# Patient Record
Sex: MALE | Race: WHITE | NOT HISPANIC OR LATINO | URBAN - METROPOLITAN AREA
[De-identification: names, ages, dates, MRNs, and addresses within clinical notes are randomized per-mention and may not be internally consistent; named-entity substitution may affect disease eponyms.]

---

## 2020-02-29 ENCOUNTER — EMERGENCY (EMERGENCY)
Facility: HOSPITAL | Age: 60
LOS: 1 days | Discharge: HOME | End: 2020-02-29
Attending: STUDENT IN AN ORGANIZED HEALTH CARE EDUCATION/TRAINING PROGRAM
Payer: COMMERCIAL

## 2020-02-29 VITALS
HEART RATE: 72 BPM | OXYGEN SATURATION: 96 % | TEMPERATURE: 98 F | HEIGHT: 68 IN | WEIGHT: 179.9 LBS | SYSTOLIC BLOOD PRESSURE: 150 MMHG | DIASTOLIC BLOOD PRESSURE: 87 MMHG | RESPIRATION RATE: 16 BRPM

## 2020-02-29 VITALS
RESPIRATION RATE: 16 BRPM | OXYGEN SATURATION: 97 % | TEMPERATURE: 98 F | HEART RATE: 76 BPM | SYSTOLIC BLOOD PRESSURE: 117 MMHG | DIASTOLIC BLOOD PRESSURE: 76 MMHG

## 2020-02-29 LAB
ALBUMIN SERPL ELPH-MCNC: 4.3 G/DL — SIGNIFICANT CHANGE UP (ref 3.3–5.2)
ALP SERPL-CCNC: 49 U/L — SIGNIFICANT CHANGE UP (ref 40–120)
ALT FLD-CCNC: 21 U/L — SIGNIFICANT CHANGE UP
ANION GAP SERPL CALC-SCNC: 15 MMOL/L — SIGNIFICANT CHANGE UP (ref 5–17)
AST SERPL-CCNC: 23 U/L — SIGNIFICANT CHANGE UP
BILIRUB SERPL-MCNC: <0.2 MG/DL — LOW (ref 0.4–2)
BUN SERPL-MCNC: 29 MG/DL — HIGH (ref 8–20)
CALCIUM SERPL-MCNC: 9.3 MG/DL — SIGNIFICANT CHANGE UP (ref 8.6–10.2)
CHLORIDE SERPL-SCNC: 101 MMOL/L — SIGNIFICANT CHANGE UP (ref 98–107)
CO2 SERPL-SCNC: 25 MMOL/L — SIGNIFICANT CHANGE UP (ref 22–29)
CREAT SERPL-MCNC: 1.05 MG/DL — SIGNIFICANT CHANGE UP (ref 0.5–1.3)
GLUCOSE SERPL-MCNC: 133 MG/DL — HIGH (ref 70–99)
HCT VFR BLD CALC: 37.6 % — LOW (ref 39–50)
HGB BLD-MCNC: 13 G/DL — SIGNIFICANT CHANGE UP (ref 13–17)
LIDOCAIN IGE QN: 22 U/L — SIGNIFICANT CHANGE UP (ref 22–51)
MAGNESIUM SERPL-MCNC: 2 MG/DL — SIGNIFICANT CHANGE UP (ref 1.6–2.6)
MCHC RBC-ENTMCNC: 32.6 PG — SIGNIFICANT CHANGE UP (ref 27–34)
MCHC RBC-ENTMCNC: 34.6 GM/DL — SIGNIFICANT CHANGE UP (ref 32–36)
MCV RBC AUTO: 94.2 FL — SIGNIFICANT CHANGE UP (ref 80–100)
NT-PROBNP SERPL-SCNC: 16 PG/ML — SIGNIFICANT CHANGE UP (ref 0–300)
PLATELET # BLD AUTO: 204 K/UL — SIGNIFICANT CHANGE UP (ref 150–400)
POTASSIUM SERPL-MCNC: 3.4 MMOL/L — LOW (ref 3.5–5.3)
POTASSIUM SERPL-SCNC: 3.4 MMOL/L — LOW (ref 3.5–5.3)
PROT SERPL-MCNC: 7.1 G/DL — SIGNIFICANT CHANGE UP (ref 6.6–8.7)
RBC # BLD: 3.99 M/UL — LOW (ref 4.2–5.8)
RBC # FLD: 11.9 % — SIGNIFICANT CHANGE UP (ref 10.3–14.5)
SODIUM SERPL-SCNC: 141 MMOL/L — SIGNIFICANT CHANGE UP (ref 135–145)
TROPONIN T SERPL-MCNC: <0.01 NG/ML — SIGNIFICANT CHANGE UP (ref 0–0.06)
WBC # BLD: 5.43 K/UL — SIGNIFICANT CHANGE UP (ref 3.8–10.5)
WBC # FLD AUTO: 5.43 K/UL — SIGNIFICANT CHANGE UP (ref 3.8–10.5)

## 2020-02-29 PROCEDURE — 70450 CT HEAD/BRAIN W/O DYE: CPT

## 2020-02-29 PROCEDURE — 71046 X-RAY EXAM CHEST 2 VIEWS: CPT | Mod: 26

## 2020-02-29 PROCEDURE — 85027 COMPLETE CBC AUTOMATED: CPT

## 2020-02-29 PROCEDURE — 80053 COMPREHEN METABOLIC PANEL: CPT

## 2020-02-29 PROCEDURE — 71046 X-RAY EXAM CHEST 2 VIEWS: CPT

## 2020-02-29 PROCEDURE — 80307 DRUG TEST PRSMV CHEM ANLYZR: CPT

## 2020-02-29 PROCEDURE — 84484 ASSAY OF TROPONIN QUANT: CPT

## 2020-02-29 PROCEDURE — 70450 CT HEAD/BRAIN W/O DYE: CPT | Mod: 26

## 2020-02-29 PROCEDURE — 93005 ELECTROCARDIOGRAM TRACING: CPT

## 2020-02-29 PROCEDURE — 99284 EMERGENCY DEPT VISIT MOD MDM: CPT | Mod: 25

## 2020-02-29 PROCEDURE — 83735 ASSAY OF MAGNESIUM: CPT

## 2020-02-29 PROCEDURE — 83880 ASSAY OF NATRIURETIC PEPTIDE: CPT

## 2020-02-29 PROCEDURE — 36415 COLL VENOUS BLD VENIPUNCTURE: CPT

## 2020-02-29 PROCEDURE — 93010 ELECTROCARDIOGRAM REPORT: CPT

## 2020-02-29 PROCEDURE — 99285 EMERGENCY DEPT VISIT HI MDM: CPT

## 2020-02-29 PROCEDURE — 83690 ASSAY OF LIPASE: CPT

## 2020-02-29 NOTE — ED ADULT NURSE NOTE - NSIMPLEMENTINTERV_GEN_ALL_ED
Implemented All Fall Risk Interventions:  [place] to call system. Call bell, personal items and telephone within reach. Instruct patient to call for assistance. Room bathroom lighting operational. Non-slip footwear when patient is off stretcher. Physically safe environment: no spills, clutter or unnecessary equipment. Stretcher in lowest position, wheels locked, appropriate side rails in place. Provide visual cue, wrist band, yellow gown, etc. Monitor gait and stability. Monitor for mental status changes and reorient to person, place, and time. Review medications for side effects contributing to fall risk. Reinforce activity limits and safety measures with patient and family.

## 2020-02-29 NOTE — ED ADULT NURSE NOTE - OBJECTIVE STATEMENT
pt presents to ER for episode of dizziness and diaphoresis PTA to hospital pt denies chest pain or syncopal episode.

## 2020-02-29 NOTE — ED PROVIDER NOTE - NSFOLLOWUPINSTRUCTIONS_ED_ALL_ED_FT
1) Follow up with your doctor in 1-2 days  2) Return to the ER for worsening or concerning symptoms    Dizziness  Dizziness is a common problem. It is a feeling of unsteadiness or light-headedness. You may feel like you are about to faint. Dizziness can lead to injury if you stumble or fall. Anyone can become dizzy, but dizziness is more common in older adults. This condition can be caused by a number of things, including medicines, dehydration, or illness.  Follow these instructions at home:  Eating and drinking     Drink enough fluid to keep your urine clear or pale yellow. This helps to keep you from becoming dehydrated. Try to drink more clear fluids, such as water.Do not drink alcohol.Limit your caffeine intake if told to do so by your health care provider. Check ingredients and nutrition facts to see if a food or beverage contains caffeine.Limit your salt (sodium) intake if told to do so by your health care provider. Check ingredients and nutrition facts to see if a food or beverage contains sodium.Activity     Avoid making quick movements.  Rise slowly from chairs and steady yourself until you feel okay.In the morning, first sit up on the side of the bed. When you feel okay, stand slowly while you hold onto something until you know that your balance is fine.If you need to  one place for a long time, move your legs often. Tighten and relax the muscles in your legs while you are standing.Do not drive or use heavy machinery if you feel dizzy.Avoid bending down if you feel dizzy. Place items in your home so that they are easy for you to reach without leaning over.Lifestyle     Do not use any products that contain nicotine or tobacco, such as cigarettes and e-cigarettes. If you need help quitting, ask your health care provider.Try to reduce your stress level by using methods such as yoga or meditation. Talk with your health care provider if you need help to manage your stress.General instructions     Watch your dizziness for any changes.Take over-the-counter and prescription medicines only as told by your health care provider. Talk with your health care provider if you think that your dizziness is caused by a medicine that you are taking.Tell a friend or a family member that you are feeling dizzy. If he or she notices any changes in your behavior, have this person call your health care provider.Keep all follow-up visits as told by your health care provider. This is important.Contact a health care provider if:  Your dizziness does not go away.Your dizziness or light-headedness gets worse.You feel nauseous.You have reduced hearing.You have new symptoms.You are unsteady on your feet or you feel like the room is spinning.Get help right away if:  You vomit or have diarrhea and are unable to eat or drink anything.You have problems talking, walking, swallowing, or using your arms, hands, or legs.You feel generally weak.You are not thinking clearly or you have trouble forming sentences. It may take a friend or family member to notice this.You have chest pain, abdominal pain, shortness of breath, or sweating.Your vision changes.You have any bleeding.You have a severe headache.You have neck pain or a stiff neck.You have a fever.These symptoms may represent a serious problem that is an emergency. Do not wait to see if the symptoms will go away. Get medical help right away. Call your local emergency services (911 in the [place] Do not drive yourself to the hospital.   Summary  Dizziness is a feeling of unsteadiness or light-headedness. This condition can be caused by a number of things, including medicines, dehydration, or illness.Anyone can become dizzy, but dizziness is more common in older adults.Drink enough fluid to keep your urine clear or pale yellow. Do not drink alcohol.Avoid making quick movements if you feel dizzy. Monitor your dizziness for any changes.This information is not intended to replace advice given to you by your health care provider. Make sure you discuss any questions you have with your health care provider.

## 2020-02-29 NOTE — ED ADULT TRIAGE NOTE - CHIEF COMPLAINT QUOTE
Pt A&Ox4 states "I got dizzy and diaphoretic at the bar, my blood pressure is high." Patient ambulated into ED with steady gait, c/o high blood pressure, dizziness, and being diaphoretic.

## 2020-02-29 NOTE — ED PROVIDER NOTE - OBJECTIVE STATEMENT
58 y/o M pt with significant PMHx of HTN presents to the ED c/o lightheaded dizziness and diaphoresis that occurred today. Pt states that this morning he was asymptomatic, took his Losartan (15mg) and then went to work. While at work one of his coworkers mentioned to him that one of his pupils appeared more dilated then the other, but he believed it was from the eye drops he used earlier. Once he reached home around 16:00 he noticed he had a dull HA so he took 81 mg of Asprin. At around 18:00 he states that his BP was mildly elevated (160/90) so he took his year old medication of Doxazosin. After the medication he felt fine so he went out to a bar with his friend and after having one drink his symptoms began. Pt states that he went outside to get some fresh air and after less than 5 minutes he began to feel better and is still currently asymptomatic. No further complaints at this time.

## 2020-02-29 NOTE — ED PROVIDER NOTE - PATIENT PORTAL LINK FT
You can access the FollowMyHealth Patient Portal offered by [facility] by registering at the following website: [email or web address]. By joining [facility]’s FollowMyHealth portal, you will also be able to view your health information using other applications (apps) compatible with our system.

## 2020-02-29 NOTE — ED PROVIDER NOTE - CLINICAL SUMMARY MEDICAL DECISION MAKING FREE TEXT BOX
Pt with episode of dizziness, likely secondary to EtOH use and Doxazosin use. Will obtain labs to rule out underlying pathology and if stable pt will be discharged to follow up outpatient.

## 2021-02-22 ENCOUNTER — NON-APPOINTMENT (OUTPATIENT)
Age: 61
End: 2021-02-22

## 2021-02-22 ENCOUNTER — APPOINTMENT (OUTPATIENT)
Dept: CARDIOLOGY | Facility: CLINIC | Age: 61
End: 2021-02-22
Payer: COMMERCIAL

## 2021-02-22 VITALS
WEIGHT: 192 LBS | OXYGEN SATURATION: 98 % | BODY MASS INDEX: 29.1 KG/M2 | HEART RATE: 75 BPM | DIASTOLIC BLOOD PRESSURE: 88 MMHG | TEMPERATURE: 97.9 F | SYSTOLIC BLOOD PRESSURE: 148 MMHG | HEIGHT: 68 IN

## 2021-02-22 VITALS — SYSTOLIC BLOOD PRESSURE: 140 MMHG | DIASTOLIC BLOOD PRESSURE: 86 MMHG

## 2021-02-22 DIAGNOSIS — Z86.79 PERSONAL HISTORY OF OTHER DISEASES OF THE CIRCULATORY SYSTEM: ICD-10-CM

## 2021-02-22 DIAGNOSIS — Z87.438 PERSONAL HISTORY OF OTHER DISEASES OF MALE GENITAL ORGANS: ICD-10-CM

## 2021-02-22 DIAGNOSIS — Z82.49 FAMILY HISTORY OF ISCHEMIC HEART DISEASE AND OTHER DISEASES OF THE CIRCULATORY SYSTEM: ICD-10-CM

## 2021-02-22 PROCEDURE — 93000 ELECTROCARDIOGRAM COMPLETE: CPT

## 2021-02-22 PROCEDURE — 99072 ADDL SUPL MATRL&STAF TM PHE: CPT

## 2021-02-22 PROCEDURE — 99244 OFF/OP CNSLTJ NEW/EST MOD 40: CPT | Mod: 25

## 2021-02-22 RX ORDER — OMEPRAZOLE 20 MG/1
20 CAPSULE, DELAYED RELEASE ORAL
Qty: 20 | Refills: 0 | Status: DISCONTINUED | COMMUNITY
Start: 2020-08-27

## 2021-02-22 RX ORDER — METRONIDAZOLE 250 MG/1
250 TABLET ORAL
Qty: 40 | Refills: 0 | Status: DISCONTINUED | COMMUNITY
Start: 2020-08-27

## 2021-02-22 RX ORDER — LOSARTAN POTASSIUM AND HYDROCHLOROTHIAZIDE 12.5; 5 MG/1; MG/1
50-12.5 TABLET ORAL EVERY MORNING
Refills: 0 | Status: DISCONTINUED | COMMUNITY
Start: 2020-11-25 | End: 2021-02-22

## 2021-02-22 RX ORDER — LOSARTAN POTASSIUM 50 MG/1
50 TABLET, FILM COATED ORAL AT BEDTIME
Refills: 0 | Status: DISCONTINUED | COMMUNITY
Start: 2021-02-10 | End: 2021-02-22

## 2021-02-22 RX ORDER — TETRACYCLINE HYDROCHLORIDE 500 MG/1
500 CAPSULE ORAL
Qty: 40 | Refills: 0 | Status: DISCONTINUED | COMMUNITY
Start: 2020-08-27

## 2021-02-27 NOTE — HISTORY OF PRESENT ILLNESS
[FreeTextEntry1] : This is a 60-year-old male referred by Dr. Louie Benson with hypertension, hyperlipidemia and palpitations.\par Patient has been diagnosed with hypertension for many years which is currently under control.  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  He has no chest pain.  He states that for over 20 years has had palpitations and skipped heartbeats, he has been diagnosed with APCs in the past.  He was on atenolol for a while, symptoms abated and he decided to stop the medication few years ago.  He still has palpitations occasionally.  In the past month or so he has had multiple runs of palpitations.  He has been tested for Covid which she does not have an had his vaccinations for COVID-19.\par He never smoked.\par Dad  72, copd. no HD\par Mother  at age 86 colon cancer. \[facility] \[facility] Labs from october with LDL of 165, HDL 59.\[facility] \[facility] EKG is done today due to palpitations and first cardiac evaluation showing normal sinus rhythm with leftward axis early RS transition anterior precordial leads true posterior wall MI cannot be excluded on ST-T changes.

## 2021-02-27 NOTE — REVIEW OF SYSTEMS
[Headache] : no headache [Recent Weight Gain (___ Lbs)] : no recent weight gain [Feeling Fatigued] : not feeling fatigued [Recent Weight Loss (___ Lbs)] : no recent weight loss [Blurry Vision] : no blurred vision [Seeing Double (Diplopia)] : no diplopia [Earache] : no earache [Discharge From The Ears] : no discharge from the ears [Shortness Of Breath] : no shortness of breath [Dyspnea on exertion] : not dyspnea during exertion [Chest Pain] : no chest pain [Lower Ext Edema] : no extremity edema [Cough] : no cough [Wheezing] : no wheezing [Abdominal Pain] : no abdominal pain [Heartburn] : no heartburn [Urinary Frequency] : no change in urinary frequency [Hematuria] : no hematuria [Nocturia] : no nocturia [Joint Pain] : no joint pain [Muscle Cramps] : no muscle cramps [Skin: A Rash] : no rash: [Skin Lesions] : no skin lesions [Dizziness] : no dizziness [Tremor] : no tremor was seen [Convulsions] : no convulsions [Confusion] : no confusion was observed [Anxiety] : no anxiety [Excessive Thirst] : no polydipsia [Easy Bleeding] : no tendency for easy bleeding [Easy Bruising] : no tendency for easy bruising

## 2021-02-27 NOTE — ASSESSMENT
[FreeTextEntry1] : 61-year-old male with hypertension, palpitations, untreated hyperlipidemia who is here for cardiac evaluation.  Patient has an abnormal EKG with leftward axis and Q waves in V1 and V2.\par He denies having any chest discomfort and is active currently.\par His blood pressure is elevated.  Increase losartan/HCTZ to 100/25 mg in the a.m. and continue with amlodipine as before.\par Blood pressure check in 2 weeks.\par Advised to decrease intake of caffeinated foods and beverages.\par Patient will wear a Holter monitor for 48 hours to evaluate episodes of palpitations.\par After review of his lipid profile, advised him to start statin therapy.  We will start him on Lipitor 10 mg daily.  Side effects discussed with patient.  Repeat lipid profile in about 3 months.\par Echocardiogram with history of palpitations.\par Patient will undergo a stress test was abnormal EKG, palpitations and history of hypertension.\par Lifestyle modification and continuation with exercise was discussed with patient.\par Advised to follow-up once the above testing is completed.

## 2021-02-27 NOTE — PHYSICAL EXAM
[Well Groomed] : well groomed [General Appearance - In No Acute Distress] : no acute distress [Normal Conjunctiva] : the conjunctiva exhibited no abnormalities [General Appearance - Well Developed] : well developed [Normal Oral Mucosa] : normal oral mucosa [No Oral Pallor] : no oral pallor [Normal Jugular Venous V Waves Present] : normal jugular venous V waves present [FreeTextEntry1] : No carotid bruit [Heart Rate And Rhythm] : heart rate and rhythm were normal [Heart Sounds] : normal S1 and S2 [Murmurs] : no murmurs present [Arterial Pulses Normal] : the arterial pulses were normal [Edema] : no peripheral edema present [Respiration, Rhythm And Depth] : normal respiratory rhythm and effort [Auscultation Breath Sounds / Voice Sounds] : lungs were clear to auscultation bilaterally [Abdomen Soft] : soft [Abdomen Tenderness] : non-tender [Abdomen Mass (___ Cm)] : no abdominal mass palpated [Abnormal Walk] : normal gait [Gait - Sufficient For Exercise Testing] : the gait was sufficient for exercise testing [Nail Clubbing] : no clubbing of the fingernails [Cyanosis, Localized] : no localized cyanosis [Petechial Hemorrhages (___cm)] : no petechial hemorrhages [Skin Color & Pigmentation] : normal skin color and pigmentation [] : no rash [No Venous Stasis] : no venous stasis [Skin Lesions] : no skin lesions [No Skin Ulcers] : no skin ulcer [No Xanthoma] : no  xanthoma was observed [Oriented To Time, Place, And Person] : oriented to person, place, and time [Affect] : the affect was normal [Mood] : the mood was normal [No Anxiety] : not feeling anxious

## 2021-02-28 LAB
ALBUMIN SERPL ELPH-MCNC: 5 G/DL
ALP BLD-CCNC: 53 U/L
ALT SERPL-CCNC: 32 U/L
ANION GAP SERPL CALC-SCNC: 15 MMOL/L
AST SERPL-CCNC: 29 U/L
BILIRUB SERPL-MCNC: 0.7 MG/DL
BUN SERPL-MCNC: 21 MG/DL
CALCIUM SERPL-MCNC: 9.7 MG/DL
CHLORIDE SERPL-SCNC: 100 MMOL/L
CHOLEST SERPL-MCNC: 180 MG/DL
CO2 SERPL-SCNC: 24 MMOL/L
CREAT SERPL-MCNC: 1.01 MG/DL
GLUCOSE SERPL-MCNC: 96 MG/DL
HDLC SERPL-MCNC: 61 MG/DL
LDLC SERPL CALC-MCNC: 99 MG/DL
NONHDLC SERPL-MCNC: 119 MG/DL
POTASSIUM SERPL-SCNC: 3.9 MMOL/L
PROT SERPL-MCNC: 7.7 G/DL
SODIUM SERPL-SCNC: 139 MMOL/L
TRIGL SERPL-MCNC: 101 MG/DL
TSH SERPL-ACNC: 3.3 UIU/ML

## 2021-03-04 NOTE — H&P PST ADULT - HISTORY OF PRESENT ILLNESS
Narrative: This is a 60 year old gentleman presenting to the [facility] area this am for a [facility] with Dr Marquise Lopez secondary to multiple runs of  palpitations and a 24-hour holter monitor revealing NSVT (2 runs). He has a PMH of HTN, HLD,BPH, basal cell carcinoma and palpitations for over 20 years.    Symptoms:        Angina (Class):        Ischemic Symptoms:     Heart Failure:        Systolic/Diastolic/Combined:        NYHA Class (within 2 weeks):     Assessment of LVEF (Must be within 6 months):       EF:        Assessed by:        Date:     Prior Cardiac Interventions (LHC, stents, CABG):       PCI's (Date, Stents, Vessels):        CABG (Date, Grafts):   EKG- NSR with leftward axis, early RS transitionanterior precordial leads.  Noninvasive Testing:   Stress Test: Date:        Protocol:        Duration of Exercise:        Symptoms:        EKG Changes:        DTS:        Myocardial Imaging:        Risk Assessment (Low, Medium, High):     Echo (Date, Findings):     Antianginal Therapies:        Beta Blockers:         Calcium Channel Blockers:        Long Acting Nitrates:        Ranexa:     Associated Risk Factors:        Cerebrovascular Disease: N/A       Chronic Lung Disease: N/A       Peripheral Arterial Disease: N/A       Chronic Kidney Disease (if yes, what is GFR): N/A       Uncontrolled Diabetes (if yes, what is HgbA1C or FBS): N/A       Poorly Controlled Hypertension (if yes, what is SBP): N/A       Morbid Obesity (if yes, what is BMI): N/A       History of Recent Ventricular Arrhythmia: N/A       Inability to Ambulate Safely: N/A       Need for Therapeutic Anticoagulation: N/A       Antiplatelet or Contrast Allergy: N/A Narrative: This is a 60 year old gentleman presenting to the Cath [facility] area this am for a LHC with Dr Marquise Lopez secondary to multiple runs of  palpitations and a 24-hour holter monitor revealing NSVT (2 runs). He has a PMH of HTN, HLD,BPH, basal cell carcinoma and palpitations for over 20 years.  He currently denies chest pain, SOB, palpitations, YU, lower extremity edema  He was placed on metoprolol which he admits compliance to.  He denies ever seeing an electrophysiologist and will be following up with Dr. Muñoz post cardiac catheterization.    EKG- NSR with leftward axis, early RS transition anterior precordial leads.

## 2021-03-04 NOTE — H&P PST ADULT - ASSESSMENT
This is a 60 year old gentleman presenting to the [facility] area this am for a C with Dr Marquise Lopez secondary to multiple runs of  palpitations recently  and a 24-hour holter monitor revealing NSVT (2 runs). He has a PMH of HTN, HLD,BPH, basal cell carcinoma and palpitations for over 20 years.    PRE-PROCEDURE ASSESSMENT  -NPO after midnight confirmed  -IV insert  -Patient seen and examined  -Labs reviewed  -Pre-procedure teaching completed with patient   consent obtained  -Questions answered    ASA-  MALL-  GFR-  Creat-  BRA-      This is a 60 year old gentleman presenting to the [facility] area this am for a LHC with Dr Marquise Lopez secondary to multiple runs of  palpitations recently  and a 24-hour holter monitor revealing NSVT (2 runs). He has a PMH of HTN, HLD,BPH, basal cell carcinoma and palpitations for over 20 years.    PRE-PROCEDURE ASSESSMENT  -NPO after midnight confirmed  -IV insert  -Patient seen and examined  -Labs reviewed  -Pre-procedure teaching completed with patient   consent obtained  -Questions answered

## 2021-03-04 NOTE — H&P PST ADULT - NEUROLOGICAL DETAILS
alert and oriented x 3/responds to pain/responds to verbal commands/sensation intact/cranial nerves intact/no spontaneous movement

## 2021-03-04 NOTE — H&P PST ADULT - RS GEN PE MLT RESP DETAILS PC
normal/airway patent/breath sounds equal/good air movement/respirations non-labored/clear to auscultation bilaterally/no chest wall tenderness/no intercostal retractions/no rales

## 2021-03-04 NOTE — H&P PST ADULT - NEGATIVE NEUROLOGICAL SYMPTOMS
no transient paralysis/no weakness/no paresthesias/no generalized seizures/no focal seizures/no syncope/no tremors/no vertigo/no loss of sensation

## 2021-03-05 ENCOUNTER — TRANSCRIPTION ENCOUNTER (OUTPATIENT)
Age: 61
End: 2021-03-05

## 2021-03-05 ENCOUNTER — OUTPATIENT (OUTPATIENT)
Dept: OUTPATIENT SERVICES | Facility: HOSPITAL | Age: 61
LOS: 1 days | End: 2021-03-05
Payer: COMMERCIAL

## 2021-03-05 VITALS
HEART RATE: 76 BPM | TEMPERATURE: 98 F | HEIGHT: 68 IN | RESPIRATION RATE: 18 BRPM | WEIGHT: 184.97 LBS | OXYGEN SATURATION: 99 % | DIASTOLIC BLOOD PRESSURE: 83 MMHG | SYSTOLIC BLOOD PRESSURE: 159 MMHG

## 2021-03-05 VITALS
DIASTOLIC BLOOD PRESSURE: 73 MMHG | HEART RATE: 72 BPM | RESPIRATION RATE: 18 BRPM | SYSTOLIC BLOOD PRESSURE: 117 MMHG | OXYGEN SATURATION: 95 %

## 2021-03-05 DIAGNOSIS — I47.2 VENTRICULAR TACHYCARDIA: ICD-10-CM

## 2021-03-05 LAB
ANION GAP SERPL CALC-SCNC: 14 MMOL/L — SIGNIFICANT CHANGE UP (ref 5–17)
APTT BLD: 33.3 SEC — SIGNIFICANT CHANGE UP (ref 27.5–35.5)
BUN SERPL-MCNC: 22 MG/DL — HIGH (ref 8–20)
CALCIUM SERPL-MCNC: 9.5 MG/DL — SIGNIFICANT CHANGE UP (ref 8.6–10.2)
CHLORIDE SERPL-SCNC: 97 MMOL/L — LOW (ref 98–107)
CO2 SERPL-SCNC: 27 MMOL/L — SIGNIFICANT CHANGE UP (ref 22–29)
CREAT SERPL-MCNC: 0.95 MG/DL — SIGNIFICANT CHANGE UP (ref 0.5–1.3)
GLUCOSE SERPL-MCNC: 109 MG/DL — HIGH (ref 70–99)
HCT VFR BLD CALC: 42.9 % — SIGNIFICANT CHANGE UP (ref 39–50)
HGB BLD-MCNC: 14.7 G/DL — SIGNIFICANT CHANGE UP (ref 13–17)
INR BLD: 0.96 RATIO — SIGNIFICANT CHANGE UP (ref 0.88–1.16)
MAGNESIUM SERPL-MCNC: 2.1 MG/DL — SIGNIFICANT CHANGE UP (ref 1.8–2.6)
MCHC RBC-ENTMCNC: 32.4 PG — SIGNIFICANT CHANGE UP (ref 27–34)
MCHC RBC-ENTMCNC: 34.3 GM/DL — SIGNIFICANT CHANGE UP (ref 32–36)
MCV RBC AUTO: 94.5 FL — SIGNIFICANT CHANGE UP (ref 80–100)
PLATELET # BLD AUTO: 294 K/UL — SIGNIFICANT CHANGE UP (ref 150–400)
POTASSIUM SERPL-MCNC: 3.5 MMOL/L — SIGNIFICANT CHANGE UP (ref 3.5–5.3)
POTASSIUM SERPL-SCNC: 3.5 MMOL/L — SIGNIFICANT CHANGE UP (ref 3.5–5.3)
PROTHROM AB SERPL-ACNC: 11.2 SEC — SIGNIFICANT CHANGE UP (ref 10.6–13.6)
RBC # BLD: 4.54 M/UL — SIGNIFICANT CHANGE UP (ref 4.2–5.8)
RBC # FLD: 11.9 % — SIGNIFICANT CHANGE UP (ref 10.3–14.5)
SARS-COV-2 RNA SPEC QL NAA+PROBE: SIGNIFICANT CHANGE UP
SODIUM SERPL-SCNC: 138 MMOL/L — SIGNIFICANT CHANGE UP (ref 135–145)
WBC # BLD: 6.87 K/UL — SIGNIFICANT CHANGE UP (ref 3.8–10.5)
WBC # FLD AUTO: 6.87 K/UL — SIGNIFICANT CHANGE UP (ref 3.8–10.5)

## 2021-03-05 PROCEDURE — 99152 MOD SED SAME PHYS/QHP 5/>YRS: CPT

## 2021-03-05 PROCEDURE — 80048 BASIC METABOLIC PNL TOTAL CA: CPT

## 2021-03-05 PROCEDURE — 83735 ASSAY OF MAGNESIUM: CPT

## 2021-03-05 PROCEDURE — 85610 PROTHROMBIN TIME: CPT

## 2021-03-05 PROCEDURE — C1769: CPT

## 2021-03-05 PROCEDURE — 99152 MOD SED SAME PHYS/QHP 5/>YRS: CPT | Mod: 59

## 2021-03-05 PROCEDURE — U0003: CPT

## 2021-03-05 PROCEDURE — 93010 ELECTROCARDIOGRAM REPORT: CPT

## 2021-03-05 PROCEDURE — 93454 CORONARY ARTERY ANGIO S&I: CPT | Mod: 26

## 2021-03-05 PROCEDURE — 85027 COMPLETE CBC AUTOMATED: CPT

## 2021-03-05 PROCEDURE — 85730 THROMBOPLASTIN TIME PARTIAL: CPT

## 2021-03-05 PROCEDURE — 93454 CORONARY ARTERY ANGIO S&I: CPT

## 2021-03-05 PROCEDURE — C1887: CPT

## 2021-03-05 PROCEDURE — U0005: CPT

## 2021-03-05 PROCEDURE — 36415 COLL VENOUS BLD VENIPUNCTURE: CPT

## 2021-03-05 PROCEDURE — 93005 ELECTROCARDIOGRAM TRACING: CPT

## 2021-03-05 PROCEDURE — C1894: CPT

## 2021-03-05 RX ORDER — METOPROLOL TARTRATE 50 MG
1 TABLET ORAL
Qty: 0 | Refills: 0 | DISCHARGE

## 2021-03-05 RX ORDER — ATORVASTATIN CALCIUM 80 MG/1
1 TABLET, FILM COATED ORAL
Qty: 0 | Refills: 0 | DISCHARGE

## 2021-03-05 RX ORDER — AMLODIPINE BESYLATE 2.5 MG/1
1 TABLET ORAL
Qty: 0 | Refills: 0 | DISCHARGE

## 2021-03-05 RX ORDER — MULTIVIT-MIN/FERROUS GLUCONATE 9 MG/15 ML
0 LIQUID (ML) ORAL
Qty: 0 | Refills: 0 | DISCHARGE

## 2021-03-05 RX ORDER — ASPIRIN/CALCIUM CARB/MAGNESIUM 324 MG
0 TABLET ORAL
Qty: 0 | Refills: 0 | DISCHARGE

## 2021-03-05 RX ORDER — LOSARTAN/HYDROCHLOROTHIAZIDE 100MG-25MG
1 TABLET ORAL
Qty: 0 | Refills: 0 | DISCHARGE

## 2021-03-05 NOTE — DISCHARGE NOTE PROVIDER - NSDCFUSCHEDAPPT_GEN_ALL_CORE_FT
LOWENBERG, THOMAS ; 03/19/2021 ; [facility] [address] LOWENBERG, THOMAS ; 03/19/2021 ; [facility] [address]

## 2021-03-05 NOTE — DISCHARGE NOTE PROVIDER - NSDCMRMEDTOKEN_GEN_ALL_CORE_FT
amLODIPine 5 mg oral tablet: 1 tab(s) orally once a day  atorvastatin 10 mg oral tablet: 1 tab(s) orally once a day  losartan-hydrochlorothiazide 100 mg-25 mg oral tablet: 1 tab(s) orally once a day  metoprolol succinate 25 mg oral tablet, extended release: 1 tab(s) orally once a day (at bedtime)  multivitamin with minerals: orally once a day

## 2021-03-05 NOTE — DISCHARGE NOTE PROVIDER - CARE PROVIDER_API CALL
Luis Muñoz)  Cardiovascular Disease  [address] [facility], [place] [address]  Phone: [phone]  Fax: [phone]  Follow Up Time:

## 2021-03-05 NOTE — DISCHARGE NOTE NURSING/CASE MANAGEMENT/SOCIAL WORK - PATIENT PORTAL LINK FT
You can access the FollowMyHealth Patient Portal offered by [facility] by registering at the following website: [email or web address]. By joining [employer]’s FollowMyHealth portal, you will also be able to view your health information using other applications (apps) compatible with our system.

## 2021-03-05 NOTE — DISCHARGE NOTE PROVIDER - NSDCCPCAREPLAN_GEN_ALL_CORE_FT
PRINCIPAL DISCHARGE DIAGNOSIS  Diagnosis: NSVT (nonsustained ventricular tachycardia)  Assessment and Plan of Treatment: -you had a cardiac catheterization with Dr. Lopez  -You had normal heart vessels and did not require any stents  -continue all of your medications; especially your metoprolol  -follow up with Dr. Muñoz to discuss further plan for your heart rhythm

## 2021-03-05 NOTE — DISCHARGE NOTE PROVIDER - HOSPITAL COURSE
Narrative: This is a 60 year old gentleman presenting to the [facility] Holding area this am for a LHC with Dr Marquise Lopez secondary to multiple runs of  palpitations and a 24-hour holter monitor revealing NSVT (2 runs). He has a PMH of HTN, HLD,BPH, basal cell carcinoma and palpitations for over 20 years.  He currently denies chest pain, SOB, palpitations, YU, lower extremity edema  He was placed on metoprolol which he admits compliance to.  He denies ever seeing an electrophysiologist and will be following up with Dr. Muñoz post cardiac catheterization.    He is now s/p LHC via RRA with Dr. Lopez  Normal Coronary Vasculature  No cardiac intervention    Neuro: A&Ox4, afebrile, LARA  Pulm: CTAB  Cardiac: RRR S1S2  Vascular palpable pulses peripherally       Stable and ready for discharge  Resume meds; follow up outpatient

## 2021-03-08 ENCOUNTER — TRANSCRIPTION ENCOUNTER (OUTPATIENT)
Age: 61
End: 2021-03-08

## 2021-03-08 ENCOUNTER — NON-APPOINTMENT (OUTPATIENT)
Age: 61
End: 2021-03-08

## 2021-03-11 ENCOUNTER — NON-APPOINTMENT (OUTPATIENT)
Age: 61
End: 2021-03-11

## 2021-03-19 ENCOUNTER — APPOINTMENT (OUTPATIENT)
Dept: CARDIOLOGY | Facility: CLINIC | Age: 61
End: 2021-03-19
Payer: COMMERCIAL

## 2021-03-19 PROCEDURE — 93306 TTE W/DOPPLER COMPLETE: CPT

## 2021-03-19 PROCEDURE — 99072 ADDL SUPL MATRL&STAF TM PHE: CPT

## 2021-03-24 PROBLEM — R00.2 PALPITATIONS: Chronic | Status: ACTIVE | Noted: 2021-03-04

## 2021-03-24 PROBLEM — E78.5 HYPERLIPIDEMIA, UNSPECIFIED: Chronic | Status: ACTIVE | Noted: 2021-03-04

## 2021-03-24 PROBLEM — I10 ESSENTIAL (PRIMARY) HYPERTENSION: Chronic | Status: ACTIVE | Noted: 2021-03-04

## 2021-04-16 ENCOUNTER — APPOINTMENT (OUTPATIENT)
Dept: MRI IMAGING | Facility: CLINIC | Age: 61
End: 2021-04-16
Payer: COMMERCIAL

## 2021-04-16 ENCOUNTER — OUTPATIENT (OUTPATIENT)
Dept: OUTPATIENT SERVICES | Facility: HOSPITAL | Age: 61
LOS: 1 days | End: 2021-04-16
Payer: COMMERCIAL

## 2021-04-16 DIAGNOSIS — I47.2 VENTRICULAR TACHYCARDIA: ICD-10-CM

## 2021-04-16 DIAGNOSIS — I42.8 OTHER CARDIOMYOPATHIES: ICD-10-CM

## 2021-04-16 PROCEDURE — 75561 CARDIAC MRI FOR MORPH W/DYE: CPT

## 2021-04-16 PROCEDURE — A9585: CPT

## 2021-04-16 PROCEDURE — 75561 CARDIAC MRI FOR MORPH W/DYE: CPT | Mod: 26

## 2021-05-07 ENCOUNTER — TRANSCRIPTION ENCOUNTER (OUTPATIENT)
Age: 61
End: 2021-05-07

## 2021-05-27 ENCOUNTER — APPOINTMENT (OUTPATIENT)
Dept: CARDIOLOGY | Facility: CLINIC | Age: 61
End: 2021-05-27
Payer: COMMERCIAL

## 2021-05-27 ENCOUNTER — NON-APPOINTMENT (OUTPATIENT)
Age: 61
End: 2021-05-27

## 2021-05-27 VITALS
DIASTOLIC BLOOD PRESSURE: 84 MMHG | HEART RATE: 81 BPM | HEIGHT: 68 IN | SYSTOLIC BLOOD PRESSURE: 130 MMHG | BODY MASS INDEX: 28.34 KG/M2 | TEMPERATURE: 98 F | RESPIRATION RATE: 17 BRPM | OXYGEN SATURATION: 96 % | WEIGHT: 187 LBS

## 2021-05-27 VITALS — DIASTOLIC BLOOD PRESSURE: 68 MMHG | SYSTOLIC BLOOD PRESSURE: 126 MMHG

## 2021-05-27 DIAGNOSIS — I42.8 OTHER CARDIOMYOPATHIES: ICD-10-CM

## 2021-05-27 DIAGNOSIS — R94.31 ABNORMAL ELECTROCARDIOGRAM [ECG] [EKG]: ICD-10-CM

## 2021-05-27 PROCEDURE — 99072 ADDL SUPL MATRL&STAF TM PHE: CPT

## 2021-05-27 PROCEDURE — 99214 OFFICE O/P EST MOD 30 MIN: CPT | Mod: 25

## 2021-05-27 PROCEDURE — 93000 ELECTROCARDIOGRAM COMPLETE: CPT

## 2021-06-03 ENCOUNTER — NON-APPOINTMENT (OUTPATIENT)
Age: 61
End: 2021-06-03

## 2021-06-03 LAB
ANION GAP SERPL CALC-SCNC: 12 MMOL/L
BUN SERPL-MCNC: 18 MG/DL
CALCIUM SERPL-MCNC: 9.7 MG/DL
CHLORIDE SERPL-SCNC: 102 MMOL/L
CO2 SERPL-SCNC: 28 MMOL/L
CREAT SERPL-MCNC: 0.96 MG/DL
GLUCOSE SERPL-MCNC: 93 MG/DL
MAGNESIUM SERPL-MCNC: 2.2 MG/DL
POTASSIUM SERPL-SCNC: 4 MMOL/L
SODIUM SERPL-SCNC: 142 MMOL/L

## 2021-06-10 ENCOUNTER — NON-APPOINTMENT (OUTPATIENT)
Age: 61
End: 2021-06-10

## 2021-06-10 ENCOUNTER — APPOINTMENT (OUTPATIENT)
Dept: ELECTROPHYSIOLOGY | Facility: CLINIC | Age: 61
End: 2021-06-10
Payer: COMMERCIAL

## 2021-06-10 VITALS
HEIGHT: 68 IN | BODY MASS INDEX: 28.34 KG/M2 | OXYGEN SATURATION: 99 % | TEMPERATURE: 97.5 F | DIASTOLIC BLOOD PRESSURE: 86 MMHG | SYSTOLIC BLOOD PRESSURE: 148 MMHG | WEIGHT: 187 LBS | HEART RATE: 64 BPM

## 2021-06-10 PROCEDURE — 99072 ADDL SUPL MATRL&STAF TM PHE: CPT

## 2021-06-10 PROCEDURE — 93000 ELECTROCARDIOGRAM COMPLETE: CPT

## 2021-06-10 PROCEDURE — 99243 OFF/OP CNSLTJ NEW/EST LOW 30: CPT

## 2021-06-10 NOTE — DISCUSSION/SUMMARY
[FreeTextEntry1] :  61 year old gentleman with history of HTN, HLD presenting for evaluation of palpitation and PVCs and NSVT. He has had episodes of palpitation which correlated with PVCs. Monitoring has revealed rather infrequent PVCs, and a brief run of NSVT which was asymptomatic and slow. We did discuss PVCs in detail, generally benign nature, and indications for treatment for bothersome symptoms or very high burden associated with presence or risk of cardiomyopathy. As his burden is relatively low and his ysmptoms mild, will continue conservative therapy. We did discuss options for antiarrhythmic meds (which he hopes to avoid) or catheter ablation if the symptoms/arrhythmia progresses. Finally, he did have NSVT, but given normal LV function and no structural heart disease on MRI, he is low risk for associated morbidity.  \par \par -continue toprol as tolerated.  \par \par -periodic Holter monitoring for PVC burden \par \par -EP followup in 6 mo or prn to review and consider further treatment as needed

## 2021-06-10 NOTE — CARDIOLOGY SUMMARY
[de-identified] : 6/10/21 sinus rhythm 64 bpm, first degree AVB  ms, no ectopy\par \par 5/27/2021: Sinus  Rhythm  - frequent PVCs \par - no st/t changes  [de-identified] : TTE 3/19/21 LVEF 50-55%, mild AI, mild-mod PI, aneurysmal atrial septum, sinus of valsalva 4.2cm  [de-identified] : Cardiac MRI 4/16/21 nml LV size and function, LVEF 54%, no edema or LGE. Dilated RV, no LGE or fatty infiltrate. Atrial septal aneurysm noted.   [de-identified] : Cath 3/5/21 normal coronaries

## 2021-06-10 NOTE — HISTORY OF PRESENT ILLNESS
[FreeTextEntry1] : 61 year old gentleman with history of HTN, HLD presenting for evaluation of palpitation and PVCs and NSVT.  \par \par Over the last year he has had episodes of palpitation, which he describes as skipped heart beats. He was found to have PVCs correlating with his symptoms, and was started on Toprol 25mg qd, which has not been particularly helpful but has been tolerated. \par \par Cath revealed no CAD, and cardiac MRI was also unremarkable.  \par \par He denies sustained palpitation, associated dizziness, or syncope. HE is an  and he has noted increased palpitation during his busy season. A couple years ago he did have presyncope around this time, which was attributed to taking too much BP medication and stress \par \par A 24 hour Holter monitor performed 2/22/21 revealed sinus rhythm (avg 74 bpm, and 532 PVCs (0.2%) and two runs of NSVT lasting 6 beats at 124 bpm. Another Holter performed 3/29/21 revealed sinus rhythm with average HR 68 bpm (range  bpm) and infrequent PVCs, total 166 PVCs (0.2%), and no NSVT. He did have his typical palpitation during monitoring.  \par \par The PVCs/NSVT, though not frequent, did appear to be predominantly of outflow tract origin.  \par An ECG from 5/27/21 revealed frequent monomorphic PVCs with outflow tract morphology (LB inferior axis, and early Rw transition).\par \par  ECG today reveals siunus rhythm 64 bpm, first degree AVB ( ms) and no ectopy.

## 2021-06-10 NOTE — REVIEW OF SYSTEMS
[Palpitations] : palpitations [Negative] : Heme/Lymph [SOB] : no shortness of breath [Chest Discomfort] : no chest discomfort [Lower Ext Edema] : no extremity edema [Syncope] : no syncope [Dizziness] : no dizziness [Convulsions] : no convulsions [Confusion] : no confusion was observed

## 2021-06-10 NOTE — PHYSICAL EXAM
[No Acute Distress] : no acute distress [Normal Conjunctiva] : normal conjunctiva [Normal Venous Pressure] : normal venous pressure [Normal S1, S2] : normal S1, S2 [No Murmur] : no murmur [Clear Lung Fields] : clear lung fields [No Respiratory Distress] : no respiratory distress  [Soft] : abdomen soft [Normal Gait] : normal gait [No Edema] : no edema [Moves all extremities] : moves all extremities [Alert and Oriented] : alert and oriented [de-identified] : deferred due to Covid 19 pandemic; patient is wearing mask

## 2021-06-23 ENCOUNTER — RX RENEWAL (OUTPATIENT)
Age: 61
End: 2021-06-23

## 2021-07-01 ENCOUNTER — APPOINTMENT (OUTPATIENT)
Dept: GASTROENTEROLOGY | Facility: CLINIC | Age: 61
End: 2021-07-01

## 2021-07-06 ENCOUNTER — APPOINTMENT (OUTPATIENT)
Dept: GASTROENTEROLOGY | Facility: CLINIC | Age: 61
End: 2021-07-06
Payer: COMMERCIAL

## 2021-07-06 VITALS
OXYGEN SATURATION: 98 % | RESPIRATION RATE: 14 BRPM | DIASTOLIC BLOOD PRESSURE: 100 MMHG | HEIGHT: 68 IN | TEMPERATURE: 97.9 F | BODY MASS INDEX: 28.04 KG/M2 | WEIGHT: 185 LBS | SYSTOLIC BLOOD PRESSURE: 170 MMHG | HEART RATE: 62 BPM

## 2021-07-06 DIAGNOSIS — Z78.9 OTHER SPECIFIED HEALTH STATUS: ICD-10-CM

## 2021-07-06 DIAGNOSIS — K62.89 OTHER SPECIFIED DISEASES OF ANUS AND RECTUM: ICD-10-CM

## 2021-07-06 DIAGNOSIS — K21.9 GASTRO-ESOPHAGEAL REFLUX DISEASE W/OUT ESOPHAGITIS: ICD-10-CM

## 2021-07-06 DIAGNOSIS — B96.81 OTHER SPECIFIED DISEASES OF ANUS AND RECTUM: ICD-10-CM

## 2021-07-06 PROCEDURE — 99203 OFFICE O/P NEW LOW 30 MIN: CPT

## 2021-07-06 PROCEDURE — 99072 ADDL SUPL MATRL&STAF TM PHE: CPT

## 2021-07-06 NOTE — ASSESSMENT
[FreeTextEntry1] : I have recommended to obtain the last colonoscopy and endoscopy results.  We will follow up after that.  If the patient needs a colonoscopy in 1 year, we will schedule him for that.  Reflux precautions were discussed at length.\par \par Octavio Faye MD\par Gastroenterology \par \par

## 2021-07-06 NOTE — HISTORY OF PRESENT ILLNESS
[de-identified] : The patient arrived for a consultation visit.  The patient is changing the care from the previous gastroenterologist as he is retiring.  Patient has family history of colorectal cancer in his mother.  He had multiple colonoscopies which had required polypectomies.  Last colonoscopy was about 2 years ago where he had a large sessile polyp.  Repeat colonoscopy in 6 months was okay.  He also had a upper endoscopy performed with Helicobacter pylori infection.  That has been eradicated after 2 treatment.  He is denying any GI symptoms except for occasional reflux.  I do not have the records.  The patient does not recall the follow-up recommendation after the last colonoscopy.

## 2021-07-06 NOTE — PHYSICAL EXAM
[General Appearance - Alert] : alert [General Appearance - In No Acute Distress] : in no acute distress [Sclera] : the sclera and conjunctiva were normal [PERRL With Normal Accommodation] : pupils were equal in size, round, and reactive to light [Extraocular Movements] : extraocular movements were intact [Outer Ear] : the ears and nose were normal in appearance [Oropharynx] : the oropharynx was normal [Neck Appearance] : the appearance of the neck was normal [Neck Cervical Mass (___cm)] : no neck mass was observed [Jugular Venous Distention Increased] : there was no jugular-venous distention [Thyroid Diffuse Enlargement] : the thyroid was not enlarged [Thyroid Nodule] : there were no palpable thyroid nodules [Auscultation Breath Sounds / Voice Sounds] : lungs were clear to auscultation bilaterally [Heart Rate And Rhythm] : heart rate was normal and rhythm regular [Heart Sounds] : normal S1 and S2 [Heart Sounds Gallop] : no gallops [Murmurs] : no murmurs [Heart Sounds Pericardial Friction Rub] : no pericardial rub [Full Pulse] : the pedal pulses are present [Edema] : there was no peripheral edema [Bowel Sounds] : normal bowel sounds [Abdomen Soft] : soft [Abdomen Tenderness] : non-tender [Abdomen Mass (___ Cm)] : no abdominal mass palpated [Cervical Lymph Nodes Enlarged Posterior Bilaterally] : posterior cervical [Cervical Lymph Nodes Enlarged Anterior Bilaterally] : anterior cervical [Supraclavicular Lymph Nodes Enlarged Bilaterally] : supraclavicular [No CVA Tenderness] : no ~M costovertebral angle tenderness [No Spinal Tenderness] : no spinal tenderness [Nail Clubbing] : no clubbing  or cyanosis of the fingernails [Abnormal Walk] : normal gait [Musculoskeletal - Swelling] : no joint swelling seen [Motor Tone] : muscle strength and tone were normal [Skin Color & Pigmentation] : normal skin color and pigmentation [Skin Turgor] : normal skin turgor [] : no rash [No Focal Deficits] : no focal deficits [Oriented To Time, Place, And Person] : oriented to person, place, and time [Impaired Insight] : insight and judgment were intact [Affect] : the affect was normal

## 2021-10-14 ENCOUNTER — RX RENEWAL (OUTPATIENT)
Age: 61
End: 2021-10-14

## 2021-11-01 ENCOUNTER — APPOINTMENT (OUTPATIENT)
Dept: FAMILY MEDICINE | Facility: CLINIC | Age: 61
End: 2021-11-01
Payer: COMMERCIAL

## 2021-11-01 VITALS
WEIGHT: 182 LBS | SYSTOLIC BLOOD PRESSURE: 122 MMHG | BODY MASS INDEX: 27.58 KG/M2 | DIASTOLIC BLOOD PRESSURE: 82 MMHG | OXYGEN SATURATION: 96 % | HEART RATE: 69 BPM | HEIGHT: 68 IN | TEMPERATURE: 96.9 F

## 2021-11-01 DIAGNOSIS — Z23 ENCOUNTER FOR IMMUNIZATION: ICD-10-CM

## 2021-11-01 DIAGNOSIS — Z11.59 ENCOUNTER FOR SCREENING FOR OTHER VIRAL DISEASES: ICD-10-CM

## 2021-11-01 PROCEDURE — 90686 IIV4 VACC NO PRSV 0.5 ML IM: CPT

## 2021-11-01 PROCEDURE — 99396 PREV VISIT EST AGE 40-64: CPT | Mod: 25

## 2021-11-01 PROCEDURE — G0008: CPT

## 2021-11-01 PROCEDURE — 36415 COLL VENOUS BLD VENIPUNCTURE: CPT

## 2021-11-01 RX ORDER — AMLODIPINE BESYLATE 5 MG/1
5 TABLET ORAL
Qty: 30 | Refills: 3 | Status: DISCONTINUED | COMMUNITY
Start: 2021-02-10 | End: 2021-11-01

## 2021-11-01 NOTE — ASSESSMENT
[FreeTextEntry1] : THOMAS LOWENBERG is a 61 year old male here for a physical exam. He has a history of hypertension, hypercholesterolemia, and an elevated PSA (last was 5.5). He stopped his amlodipine and his blood pressure is fine without it. He is up to date with cardiology and does not need an EKG today.

## 2021-11-01 NOTE — HISTORY OF PRESENT ILLNESS
[FreeTextEntry1] : THOMAS LOWENBERG is a 61 year old male here for a physical exam.  [de-identified] : His last PE was 10/2020\par His last tetanus shot was 2020\par He has had Shingrix \par He has had the COVID vaccine\par His last dentist visit was less than 6 months ago \par His last eye doctor appointment was 2 years ago\par His last dermatologist visit was a few years ago\par His diet is healthy overall\par Exercise: cardio, running, walking\par His last colonoscopy was 6/30/20, repeat 2 years

## 2021-11-04 ENCOUNTER — TRANSCRIPTION ENCOUNTER (OUTPATIENT)
Age: 61
End: 2021-11-04

## 2021-11-04 LAB
ALBUMIN SERPL ELPH-MCNC: 4.8 G/DL
ALP BLD-CCNC: 56 U/L
ALT SERPL-CCNC: 16 U/L
ANION GAP SERPL CALC-SCNC: 15 MMOL/L
AST SERPL-CCNC: 19 U/L
BILIRUB SERPL-MCNC: 0.4 MG/DL
BUN SERPL-MCNC: 21 MG/DL
CALCIUM SERPL-MCNC: 9.7 MG/DL
CHLORIDE SERPL-SCNC: 102 MMOL/L
CHOLEST SERPL-MCNC: 162 MG/DL
CO2 SERPL-SCNC: 24 MMOL/L
COVID-19 SPIKE DOMAIN ANTIBODY INTERPRETATION: POSITIVE
CREAT SERPL-MCNC: 1.06 MG/DL
GLUCOSE SERPL-MCNC: 109 MG/DL
HDLC SERPL-MCNC: 60 MG/DL
LDLC SERPL CALC-MCNC: 85 MG/DL
NONHDLC SERPL-MCNC: 101 MG/DL
POTASSIUM SERPL-SCNC: 4.4 MMOL/L
PROT SERPL-MCNC: 7.2 G/DL
PSA SERPL-MCNC: 6.86 NG/ML
SARS-COV-2 AB SERPL IA-ACNC: >250 U/ML
SODIUM SERPL-SCNC: 140 MMOL/L
TRIGL SERPL-MCNC: 83 MG/DL

## 2021-11-24 ENCOUNTER — TRANSCRIPTION ENCOUNTER (OUTPATIENT)
Age: 61
End: 2021-11-24

## 2021-12-16 ENCOUNTER — APPOINTMENT (OUTPATIENT)
Dept: ELECTROPHYSIOLOGY | Facility: CLINIC | Age: 61
End: 2021-12-16
Payer: COMMERCIAL

## 2021-12-16 VITALS
SYSTOLIC BLOOD PRESSURE: 148 MMHG | OXYGEN SATURATION: 97 % | BODY MASS INDEX: 28.79 KG/M2 | TEMPERATURE: 98.4 F | HEIGHT: 68 IN | WEIGHT: 190 LBS | HEART RATE: 70 BPM | DIASTOLIC BLOOD PRESSURE: 98 MMHG

## 2021-12-16 PROCEDURE — 93000 ELECTROCARDIOGRAM COMPLETE: CPT | Mod: 59

## 2021-12-16 PROCEDURE — 99214 OFFICE O/P EST MOD 30 MIN: CPT

## 2021-12-16 PROCEDURE — 93242 EXT ECG>48HR<7D RECORDING: CPT

## 2021-12-16 NOTE — HISTORY OF PRESENT ILLNESS
[FreeTextEntry1] : 61 year old gentleman with history of HTN, HLD presenting for follow up of palpitation and PVCs and NSVT. \par \par To summarize, over the last year he has had episodes of palpitation, which he describes as skipped heart beats. He was found to have PVCs correlating with his symptoms, and was started on Toprol 25mg qd, which has not been particularly helpful but has been tolerated. \par \par Cath revealed no CAD, and cardiac MRI was also unremarkable. He also has a remote history of presyncope. \par \par A 24 hour Holter monitor performed 2/22/21 revealed sinus rhythm (avg 74 bpm, and 532 PVCs (0.2%) and two runs of NSVT lasting 6 beats at 124 bpm. Another Holter performed 3/29/21 revealed sinus rhythm with average HR 68 bpm (range  bpm) and infrequent PVCs, total 166 PVCs (0.2%), and no NSVT. He did have his typical palpitation during monitoring. \par \par The PVCs/NSVT, though not frequent, did appear to be predominantly of outflow tract origin. 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 Ongoing periodic holter monitoring to assess burden recommended.\par \par Today, he reports he overall has been feeling well since last follow up. Feels palpitations daily which have correlated with his PVCs in the past. During these palpitations he denies sob, dizziness, near syncope. He does however report a history of 3 near syncopal episodes. Two occurred prior to previous monitoring and one approximately 1 month ago. When they occur he is always at his desk at work and utilizes two computer screens. Denies room spinning sensation. He did not experience these symptoms while wearing monitor in past.

## 2021-12-16 NOTE — REVIEW OF SYSTEMS
[Headache] : no headache [Feeling Fatigued] : not feeling fatigued [SOB] : no shortness of breath [Dyspnea on exertion] : not dyspnea during exertion [Chest Discomfort] : no chest discomfort [Palpitations] : palpitations [Orthopnea] : no orthopnea [Syncope] : no syncope [Dizziness] : dizziness

## 2021-12-16 NOTE — DISCUSSION/SUMMARY
[FreeTextEntry1] : 61 year old gentleman with history of HTN, HLD presenting for follow up of palpitation and PVCs and NSVT. \par \par To summarize, over the last year he has had episodes of palpitation, which he describes as skipped heart beats. He was found to have PVCs correlating with his symptoms, and was started on Toprol 25mg qd, which has not been particularly helpful but has been tolerated. \par \par Cath revealed no CAD, and cardiac MRI was also unremarkable. He also has a remote history of presyncope. \par \par A 24 hour Holter monitor performed 2/22/21 revealed sinus rhythm (avg 74 bpm, and 532 PVCs (0.2%) and two runs of NSVT lasting 6 beats at 124 bpm. Another Holter performed 3/29/21 revealed sinus rhythm with average HR 68 bpm (range  bpm) and infrequent PVCs, total 166 PVCs (0.2%), and no NSVT. He did have his typical palpitation during monitoring. \par \par The PVCs/NSVT, though not frequent, did appear to be predominantly of outflow tract origin. 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 Ongoing periodic holter monitoring to assess burden recommended.\par \par Today, he reports he overall has been feeling well since last follow up. Feels palpitations daily which have correlated with his PVCs in the past. During these palpitations he denies sob, dizziness, near syncope. He does however report a history of 3 near syncopal episodes. Two occurred prior to previous monitoring and one approximately 1 month ago. When they occur he is always at his desk at work and utilizes two computer screens. Denies room spinning sensation. He did not experience these symptoms while wearing monitor in past. \par \par Recommendation:\par \par -Mr. Lowenberg has continued to have symptomatic PVCs which are mildly bothersome. Will repeat 48 hour Holter to quantify burden. We discussed trial of increasing toprol to 50 mg to see if helpful and if no improvement will reduce back to 25 mg daily. HIstory of NSVT with cath revealing no CAD and Cardiac MRI with no LGE. We also discussed lifestyle modifications including adequate rest, increased exercise. To initiate OTC magnesium.\par -Near syncope x 3, not captured on previous monitoring. We discussed monitoring options including external MCOT, ILR, or apple watch. He wishes to obtain apple watch with EKG capabilities for symptom/rhythm correlation if near syncope reoccurs.\par -To arrange EP follow up in 6 months or sooner PRN.\par \par Malika Nichols ANP-C

## 2022-01-24 ENCOUNTER — RX RENEWAL (OUTPATIENT)
Age: 62
End: 2022-01-24

## 2022-04-04 ENCOUNTER — RX RENEWAL (OUTPATIENT)
Age: 62
End: 2022-04-04

## 2022-04-11 PROBLEM — Z11.59 SCREENING FOR VIRAL DISEASE: Status: ACTIVE | Noted: 2021-11-01

## 2022-06-16 ENCOUNTER — NON-APPOINTMENT (OUTPATIENT)
Age: 62
End: 2022-06-16

## 2022-06-16 ENCOUNTER — APPOINTMENT (OUTPATIENT)
Dept: CARDIOLOGY | Facility: CLINIC | Age: 62
End: 2022-06-16
Payer: COMMERCIAL

## 2022-06-16 VITALS
DIASTOLIC BLOOD PRESSURE: 86 MMHG | WEIGHT: 190 LBS | OXYGEN SATURATION: 97 % | SYSTOLIC BLOOD PRESSURE: 136 MMHG | HEIGHT: 68 IN | HEART RATE: 66 BPM | TEMPERATURE: 97.9 F | BODY MASS INDEX: 28.79 KG/M2

## 2022-06-16 PROCEDURE — 93000 ELECTROCARDIOGRAM COMPLETE: CPT

## 2022-06-16 PROCEDURE — 99214 OFFICE O/P EST MOD 30 MIN: CPT

## 2022-06-19 NOTE — ASSESSMENT
[FreeTextEntry1] : Patient with history of PVCs and nonsustained VT.\par On beta-blocker still has symptoms.  Advised to decrease the dose of beta-blocker into half for couple days and then stop.  We will then try verapamil.\par If he tolerates verapamil, we will perform a Holter monitor for PVC burden.\par If he has more PVCs he will go back on metoprolol.\par Blood pressure is to goal\par .  Had labs from PMD which we will request. On lipitor 10 mg, no side effects Last LDL from 11/21 was 85 mg/dl. . 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

## 2022-06-19 NOTE — REVIEW OF SYSTEMS
[Negative] : Heme/Lymph [Palpitations] : palpitations [Negative] : Gastrointestinal [Headache] : no headache [Feeling Fatigued] : not feeling fatigued [Seeing Double (Diplopia)] : no diplopia [Discharge From Ears] : no discharge from the ears [Sore Throat] : no sore throat [Dyspnea on exertion] : not dyspnea during exertion [Urinary Frequency] : no change in urinary frequency [Confusion] : no confusion was observed

## 2022-06-19 NOTE — HISTORY OF PRESENT ILLNESS
[FreeTextEntry1] : 62-year-old male, referred by Dr. Louie Benson with hypertension, hyperlipidemia, palpitations with nonsustained VT who had a cardiac cath and had normal coronary arteries.  Patient with frequent PVCs totaling about 7% of his beats on a Holter from December 2021.  At times he feels skipped heartbeats.  He also has PVCs recorded on Apple Watch.  No lightheadedness, syncope or presyncope since then.\par Has seen EP who recommended periodic Holter as well as echocardiogram.  He runs about 2 to 3 miles per day.  Denies any chest pain or shortness of breath.  Compliant with medications.\par \par EKG today shows a sinus rhythm with leftward axis, nonspecific T wave changes.\par

## 2022-06-19 NOTE — PHYSICAL EXAM
[Normal Venous Pressure] : normal venous pressure [Normal S1, S2] : normal S1, S2 [Normal] : alert and oriented, normal memory [de-identified] : No bruit [de-identified] : 2 out of 6 diastolic murmur left sternal border

## 2022-06-23 ENCOUNTER — APPOINTMENT (OUTPATIENT)
Dept: ELECTROPHYSIOLOGY | Facility: CLINIC | Age: 62
End: 2022-06-23
Payer: COMMERCIAL

## 2022-06-23 ENCOUNTER — NON-APPOINTMENT (OUTPATIENT)
Age: 62
End: 2022-06-23

## 2022-06-23 VITALS
DIASTOLIC BLOOD PRESSURE: 90 MMHG | HEART RATE: 69 BPM | TEMPERATURE: 98.1 F | RESPIRATION RATE: 16 BRPM | BODY MASS INDEX: 28.64 KG/M2 | HEIGHT: 68 IN | WEIGHT: 189 LBS | SYSTOLIC BLOOD PRESSURE: 158 MMHG | OXYGEN SATURATION: 95 %

## 2022-06-23 DIAGNOSIS — I49.3 VENTRICULAR PREMATURE DEPOLARIZATION: ICD-10-CM

## 2022-06-23 DIAGNOSIS — I47.2 VENTRICULAR TACHYCARDIA: ICD-10-CM

## 2022-06-23 DIAGNOSIS — R00.2 PALPITATIONS: ICD-10-CM

## 2022-06-23 PROCEDURE — 99214 OFFICE O/P EST MOD 30 MIN: CPT

## 2022-06-23 PROCEDURE — 93000 ELECTROCARDIOGRAM COMPLETE: CPT

## 2022-06-23 NOTE — REVIEW OF SYSTEMS
[Headache] : no headache [Feeling Fatigued] : not feeling fatigued [SOB] : no shortness of breath [Dyspnea on exertion] : not dyspnea during exertion [Chest Discomfort] : no chest discomfort [Palpitations] : palpitations [Orthopnea] : no orthopnea [Syncope] : no syncope [Dizziness] : no dizziness

## 2022-06-23 NOTE — DISCUSSION/SUMMARY
[FreeTextEntry1] : 62 year old gentleman with history of HTN, HLD presenting for evaluation of palpitation and PVCs and NSVT. \par \par He was first evaluated by Dr. Hanson 6/2021 and reported he had episodes of palpitations that began approximately 1 year ago, which he described as skipped heart beats. He was found to have PVCs correlating with his symptoms, and was started on Toprol 25mg qd, which did not significantly improve symptoms. \par \par Cath revealed no CAD, and cardiac MRI was also unremarkable. \par \par A 24 hour Holter monitor performed 2/22/21 revealed sinus rhythm (avg 74 bpm, and 532 PVCs (0.2%) and two runs of NSVT lasting 6 beats at 124 bpm. Another Holter performed 3/29/21 revealed sinus rhythm with average HR 68 bpm (range  bpm) and infrequent PVCs, total 166 PVCs (0.2%), and no NSVT. \par \par The PVCs/NSVT, though not frequent, did appear to be predominantly of outflow tract origin. Due to low burden and mild symptoms ongoing monitoring was recommended. \par  \par Repeat External monitoring worn 12/16/21 for 2 days revealed PVC burden 7.1%. He was recently evaluated by  who ordered repeat echo pending and switched metoprolol to verapamil.\par \par Today, he reports he has been feeling well since last follow up. Very mild rare symptoms of skipped beats which correlate with PVCs on apple watch. Denies dizziness, near syncope, syncope. \par \par Recommendation: \par \par We reviewed PVC management as discussed last visit. His burden continues to be <10%, at times as low as 0.2%. His symptoms are mild, and last echo revealed no evidence of cardiomyopathy. We will continue conservative therapy. Again reviewed recommendation for ongoing monitoring with annual holter/echo. If symptom/PVC burden were to rise or any evidence of cardiomyopathy again reviewed options for antiarrhythmic medication versus catheter ablation. He will continue follow up with Dr. Muñoz and follow up with EP on an as needed basis.\par \par Malika RODRIGUEZ-C \par \par

## 2022-06-23 NOTE — HISTORY OF PRESENT ILLNESS
[FreeTextEntry1] : 62 year old gentleman with history of HTN, HLD presenting for evaluation of palpitation and PVCs and NSVT. \par \par He was first evaluated by Dr. Hanson 6/2021 and reported he had episodes of palpitations that began approximately 1 year ago, which he described as skipped heart beats. He was found to have PVCs correlating with his symptoms, and was started on Toprol 25mg qd, which did not significantly improve symptoms. \par \par Cath revealed no CAD, and cardiac MRI was also unremarkable. \par \par A 24 hour Holter monitor performed 2/22/21 revealed sinus rhythm (avg 74 bpm, and 532 PVCs (0.2%) and two runs of NSVT lasting 6 beats at 124 bpm. Another Holter performed 3/29/21 revealed sinus rhythm with average HR 68 bpm (range  bpm) and infrequent PVCs, total 166 PVCs (0.2%), and no NSVT. \par \par The PVCs/NSVT, though not frequent, did appear to be predominantly of outflow tract origin. Due to low burden and mild symptoms ongoing monitoring was recommended. \par  \par Repeat External monitoring worn 12/16/21 for 2 days revealed PVC burden 7.1%. He was recently evaluated by  who ordered repeat echo pending and switched metoprolol to verapamil.\par \par Today, he reports he has been feeling well since last follow up. Very mild rare symptoms of skipped beats which correlate with PVCs on apple watch. Denies dizziness, near syncope, syncope. \par \par \par

## 2022-06-27 ENCOUNTER — APPOINTMENT (OUTPATIENT)
Dept: CARDIOLOGY | Facility: CLINIC | Age: 62
End: 2022-06-27
Payer: COMMERCIAL

## 2022-06-27 PROCEDURE — 93306 TTE W/DOPPLER COMPLETE: CPT

## 2022-07-13 ENCOUNTER — APPOINTMENT (OUTPATIENT)
Dept: GASTROENTEROLOGY | Facility: CLINIC | Age: 62
End: 2022-07-13

## 2022-07-13 VITALS
TEMPERATURE: 97.5 F | OXYGEN SATURATION: 98 % | RESPIRATION RATE: 16 BRPM | WEIGHT: 190 LBS | HEART RATE: 78 BPM | HEIGHT: 68 IN | SYSTOLIC BLOOD PRESSURE: 160 MMHG | DIASTOLIC BLOOD PRESSURE: 98 MMHG | BODY MASS INDEX: 28.79 KG/M2

## 2022-07-13 DIAGNOSIS — Z12.11 ENCOUNTER FOR SCREENING FOR MALIGNANT NEOPLASM OF COLON: ICD-10-CM

## 2022-07-13 PROCEDURE — 99213 OFFICE O/P EST LOW 20 MIN: CPT

## 2022-07-13 NOTE — HISTORY OF PRESENT ILLNESS
[de-identified] : Patient arrived for scheduling colonoscopy. He has history of large colon polyp removed in the past and had undergone colonoscopy 6 months after that. Doing well at this time. He is due for surveillance colonoscopy.

## 2022-07-13 NOTE — ASSESSMENT
[FreeTextEntry1] : Patient is medically optimized for the procedure. The bowel preparation was discussed at length. Risks (including bleeding, pain, perforation, incomplete examination, splenic laceration, adverse reactions to medications, aspiration and death), benefits and alternatives were discussed. Patient is agreeable for the colonoscopy. The patient is medically optimized for the procedure. We will schedule the patient for the procedure. Bowel preparation was sent to the pharmacy.\par \par \par Octavio Faye MD\par Gastroenterology \par \par

## 2022-08-04 RX ORDER — METOPROLOL SUCCINATE 25 MG/1
25 TABLET, EXTENDED RELEASE ORAL
Qty: 90 | Refills: 1 | Status: ACTIVE | COMMUNITY
Start: 2022-06-23 | End: 1900-01-01

## 2022-08-04 RX ORDER — VERAPAMIL HYDROCHLORIDE 100 MG/1
100 CAPSULE, EXTENDED RELEASE ORAL DAILY
Qty: 30 | Refills: 2 | Status: DISCONTINUED | COMMUNITY
Start: 2022-06-16 | End: 2022-08-04

## 2022-08-30 ENCOUNTER — TRANSCRIPTION ENCOUNTER (OUTPATIENT)
Age: 62
End: 2022-08-30

## 2022-08-31 ENCOUNTER — TRANSCRIPTION ENCOUNTER (OUTPATIENT)
Age: 62
End: 2022-08-31

## 2022-10-20 ENCOUNTER — TRANSCRIPTION ENCOUNTER (OUTPATIENT)
Age: 62
End: 2022-10-20

## 2022-10-20 NOTE — PHYSICAL EXAM
[Alert] : alert [Normal Voice/Communication] : normal voice/communication [Healthy Appearing] : healthy appearing [No Acute Distress] : no acute distress [Sclera] : the sclera and conjunctiva were normal [Hearing Threshold Finger Rub Not [place]] : hearing was normal [Normal Lips/Gums] : the lips and gums were normal [Oropharynx] : the oropharynx was normal [Normal Appearance] : the appearance of the neck was normal [No Neck Mass] : no neck mass was observed [No Respiratory Distress] : no respiratory distress [No Acc Muscle Use] : no accessory muscle use [Respiration, Rhythm And Depth] : normal respiratory rhythm and effort [Auscultation Breath Sounds / Voice Sounds] : lungs were clear to auscultation bilaterally [Heart Rate And Rhythm] : heart rate was normal and rhythm regular [Normal S1, S2] : normal S1 and S2 [Murmurs] : no murmurs [Bowel Sounds] : normal bowel sounds [Abdomen Tenderness] : non-tender [No Masses] : no abdominal mass palpated [] : no hepatosplenomegaly [Abdomen Soft] : soft [Oriented To Time, Place, And Person] : oriented to person, place, and time

## 2022-10-21 ENCOUNTER — APPOINTMENT (OUTPATIENT)
Dept: GASTROENTEROLOGY | Facility: GI CENTER | Age: 62
End: 2022-10-21

## 2022-10-21 ENCOUNTER — RESULT REVIEW (OUTPATIENT)
Age: 62
End: 2022-10-21

## 2022-10-21 ENCOUNTER — OUTPATIENT (OUTPATIENT)
Dept: OUTPATIENT SERVICES | Facility: HOSPITAL | Age: 62
LOS: 1 days | Discharge: HOME | End: 2022-10-21
Payer: COMMERCIAL

## 2022-10-21 DIAGNOSIS — K63.5 POLYP OF COLON: ICD-10-CM

## 2022-10-21 DIAGNOSIS — Z86.010 PERSONAL HISTORY OF COLONIC POLYPS: ICD-10-CM

## 2022-10-21 DIAGNOSIS — K57.30 DIVERTICULOSIS OF LARGE INTESTINE W/OUT PERFORATION OR ABSCESS W/OUT BLEEDING: ICD-10-CM

## 2022-10-21 PROCEDURE — 45380 COLONOSCOPY AND BIOPSY: CPT | Mod: PT

## 2022-10-21 PROCEDURE — 88305 TISSUE EXAM BY PATHOLOGIST: CPT | Mod: 26

## 2022-10-21 PROCEDURE — 88305 TISSUE EXAM BY PATHOLOGIST: CPT

## 2022-10-21 NOTE — HISTORY OF PRESENT ILLNESS
[FreeTextEntry1] : The patient arrived for a colonoscopy.  No other complaints.  Has a history of large colon polyp resection.

## 2022-10-26 LAB — SURGICAL PATHOLOGY STUDY: SIGNIFICANT CHANGE UP

## 2022-11-02 ENCOUNTER — APPOINTMENT (OUTPATIENT)
Dept: FAMILY MEDICINE | Facility: CLINIC | Age: 62
End: 2022-11-02

## 2022-11-02 VITALS
SYSTOLIC BLOOD PRESSURE: 164 MMHG | DIASTOLIC BLOOD PRESSURE: 98 MMHG | OXYGEN SATURATION: 98 % | BODY MASS INDEX: 28.13 KG/M2 | HEART RATE: 71 BPM | TEMPERATURE: 97 F | WEIGHT: 185 LBS

## 2022-11-02 VITALS — DIASTOLIC BLOOD PRESSURE: 82 MMHG | SYSTOLIC BLOOD PRESSURE: 140 MMHG

## 2022-11-02 PROCEDURE — 36415 COLL VENOUS BLD VENIPUNCTURE: CPT

## 2022-11-02 PROCEDURE — 90686 IIV4 VACC NO PRSV 0.5 ML IM: CPT

## 2022-11-02 PROCEDURE — 90471 IMMUNIZATION ADMIN: CPT

## 2022-11-02 PROCEDURE — 99396 PREV VISIT EST AGE 40-64: CPT | Mod: 25

## 2022-11-02 RX ORDER — SODIUM SULFATE, POTASSIUM SULFATE, MAGNESIUM SULFATE 17.5; 3.13; 1.6 G/ML; G/ML; G/ML
17.5-3.13-1.6 SOLUTION, CONCENTRATE ORAL
Qty: 1 | Refills: 0 | Status: DISCONTINUED | COMMUNITY
Start: 2022-07-13 | End: 2022-11-02

## 2022-11-02 RX ORDER — FLUOROURACIL 50 MG/G
5 CREAM TOPICAL
Qty: 40 | Refills: 0 | Status: ACTIVE | COMMUNITY
Start: 2022-09-16

## 2022-11-02 NOTE — HEALTH RISK ASSESSMENT
[0] : 2) Feeling down, depressed, or hopeless: Not at all (0) [PHQ-2 Negative - No further assessment needed] : PHQ-2 Negative - No further assessment needed [[id number]] : 0

## 2022-11-02 NOTE — ASSESSMENT
[FreeTextEntry1] : THOMAS LOWENBERG is a 62 year old male here for a physical exam.\par \par Patient has a history of hypertension, hyperlipidemia, and elevated prostate specific antigen. He has white coat hypertension. His blood pressure is better at home.\par \par He sees a cardiologist regularly and does not need an EKG today. \par \par He sees a urologist in [facility] and had a recent prostate MRI which was reportedly negative. He requests a PSA today.\par

## 2022-11-02 NOTE — PHYSICAL EXAM
[No Acute Distress] : no acute distress [Well Nourished] : well nourished [Well Developed] : well developed [Well-Appearing] : well-appearing [Normal Sclera/Conjunctiva] : normal sclera/conjunctiva [PERRL] : pupils equal round and reactive to light [EOMI] : extraocular movements intact [Normal Outer Ear/Nose] : the outer ears and nose were normal in appearance [Normal Oropharynx] : the oropharynx was normal [No Lymphadenopathy] : no lymphadenopathy [No JVD] : no jugular venous distention [Supple] : supple [Thyroid Normal, No Nodules] : the thyroid was normal and there were no nodules present [No Respiratory Distress] : no respiratory distress  [No Accessory Muscle Use] : no accessory muscle use [Clear to Auscultation] : lungs were clear to auscultation bilaterally [Normal Rate] : normal rate  [Normal S1, S2] : normal S1 and S2 [Regular Rhythm] : with a regular rhythm [No Murmur] : no murmur heard [No Carotid Bruits] : no carotid bruits [No Varicosities] : no varicosities [Pedal Pulses Present] : the pedal pulses are present [No Edema] : there was no peripheral edema [No Extremity Clubbing/Cyanosis] : no extremity clubbing/cyanosis [Soft] : abdomen soft [Non Tender] : non-tender [Non-distended] : non-distended [No HSM] : no HSM [No Masses] : no abdominal mass palpated [Normal Bowel Sounds] : normal bowel sounds [Normal Posterior Cervical Nodes] : no posterior cervical lymphadenopathy [Normal Anterior Cervical Nodes] : no anterior cervical lymphadenopathy [No CVA Tenderness] : no CVA  tenderness [No Spinal Tenderness] : no spinal tenderness [No Joint Swelling] : no joint swelling [Grossly Normal Strength/Tone] : grossly normal strength/tone [No Rash] : no rash [Coordination Grossly Intact] : coordination grossly intact [No Focal Deficits] : no focal deficits [Normal Gait] : normal gait [Normal Affect] : the affect was normal [Normal Insight/Judgement] : insight and judgment were intact

## 2022-11-02 NOTE — HISTORY OF PRESENT ILLNESS
[FreeTextEntry1] : THOMAS LOWENBERG is a 62 year old male here for a physical exam.  [de-identified] : His last physical exam was last year \par \par Vaccines: \par Tetanus is up to date; last 10/2020\par Shingrix is up to date (2019, [facility], will call for dates)\par COVID vaccine is up to date \par \par His last dentist visit was less than one year ago \par His last eye doctor appointment was three years ago \par His last dermatologist visit was a few years ago (Dr. Richards)\par \par Colon cancer screening is up to date; colonoscopy 10/21/22 \par \par His diet is healthy overall \par Exercise: cardio, running, walking

## 2022-11-02 NOTE — PLAN
[FreeTextEntry1] : Continue all medications as prescribed. Check labs as above. Will adjust any medications based upon lab results. \par \par Reviewed age-appropriate preventive screening tests with patient. \par \par Discussed clean eating (e.g. Mediterranean style diet) and recommendations for regular exercise/staying as physically active as possible. \par \par Reviewed importance of good self care (e.g. meditation, yoga, adequate rest, regular exercise, magnesium, clean eating, etc.). \par \par Follow up for next physical in one year.

## 2022-11-03 ENCOUNTER — TRANSCRIPTION ENCOUNTER (OUTPATIENT)
Age: 62
End: 2022-11-03

## 2022-11-03 ENCOUNTER — NON-APPOINTMENT (OUTPATIENT)
Age: 62
End: 2022-11-03

## 2022-11-03 LAB
ALBUMIN SERPL ELPH-MCNC: 5 G/DL
ALP BLD-CCNC: 54 U/L
ALT SERPL-CCNC: 23 U/L
ANION GAP SERPL CALC-SCNC: 10 MMOL/L
AST SERPL-CCNC: 25 U/L
BILIRUB SERPL-MCNC: 0.8 MG/DL
BUN SERPL-MCNC: 18 MG/DL
CALCIUM SERPL-MCNC: 10 MG/DL
CHLORIDE SERPL-SCNC: 100 MMOL/L
CHOLEST SERPL-MCNC: 175 MG/DL
CO2 SERPL-SCNC: 28 MMOL/L
CREAT SERPL-MCNC: 1.04 MG/DL
EGFR: 80 ML/MIN/1.73M2
GLUCOSE SERPL-MCNC: 101 MG/DL
HDLC SERPL-MCNC: 57 MG/DL
LDLC SERPL CALC-MCNC: 98 MG/DL
NONHDLC SERPL-MCNC: 118 MG/DL
POTASSIUM SERPL-SCNC: 4.2 MMOL/L
PROT SERPL-MCNC: 7.2 G/DL
PSA SERPL-MCNC: 6.87 NG/ML
SODIUM SERPL-SCNC: 138 MMOL/L
TRIGL SERPL-MCNC: 99 MG/DL

## 2022-12-21 ENCOUNTER — RX RENEWAL (OUTPATIENT)
Age: 62
End: 2022-12-21

## 2023-02-02 ENCOUNTER — APPOINTMENT (OUTPATIENT)
Dept: CARDIOLOGY | Facility: CLINIC | Age: 63
End: 2023-02-02

## 2023-02-13 ENCOUNTER — RX RENEWAL (OUTPATIENT)
Age: 63
End: 2023-02-13

## 2023-11-02 ENCOUNTER — APPOINTMENT (OUTPATIENT)
Dept: FAMILY MEDICINE | Facility: CLINIC | Age: 63
End: 2023-11-02
Payer: COMMERCIAL

## 2023-11-02 VITALS
HEIGHT: 68 IN | OXYGEN SATURATION: 95 % | BODY MASS INDEX: 29.1 KG/M2 | TEMPERATURE: 97 F | WEIGHT: 192 LBS | DIASTOLIC BLOOD PRESSURE: 88 MMHG | SYSTOLIC BLOOD PRESSURE: 132 MMHG | HEART RATE: 72 BPM

## 2023-11-02 DIAGNOSIS — R97.20 ELEVATED PROSTATE, SPECIFIC ANTIGEN [PSA]: ICD-10-CM

## 2023-11-02 DIAGNOSIS — Z00.00 ENCOUNTER FOR GENERAL ADULT MEDICAL EXAMINATION W/OUT ABNORMAL FINDINGS: ICD-10-CM

## 2023-11-02 DIAGNOSIS — E78.5 HYPERLIPIDEMIA, UNSPECIFIED: ICD-10-CM

## 2023-11-02 DIAGNOSIS — I10 ESSENTIAL (PRIMARY) HYPERTENSION: ICD-10-CM

## 2023-11-02 PROCEDURE — G0008: CPT

## 2023-11-02 PROCEDURE — 90686 IIV4 VACC NO PRSV 0.5 ML IM: CPT

## 2023-11-02 PROCEDURE — 36415 COLL VENOUS BLD VENIPUNCTURE: CPT

## 2023-11-02 PROCEDURE — 99397 PER PM REEVAL EST PAT 65+ YR: CPT | Mod: 25

## 2023-11-05 ENCOUNTER — TRANSCRIPTION ENCOUNTER (OUTPATIENT)
Age: 63
End: 2023-11-05

## 2023-11-05 DIAGNOSIS — R73.01 IMPAIRED FASTING GLUCOSE: ICD-10-CM

## 2023-11-05 LAB
ALBUMIN SERPL ELPH-MCNC: 5 G/DL
ALP BLD-CCNC: 58 U/L
ALT SERPL-CCNC: 24 U/L
ANION GAP SERPL CALC-SCNC: 12 MMOL/L
AST SERPL-CCNC: 24 U/L
BASOPHILS # BLD AUTO: 0.03 K/UL
BASOPHILS NFR BLD AUTO: 0.8 %
BILIRUB SERPL-MCNC: 0.6 MG/DL
BUN SERPL-MCNC: 19 MG/DL
CALCIUM SERPL-MCNC: 9.3 MG/DL
CHLORIDE SERPL-SCNC: 98 MMOL/L
CHOLEST SERPL-MCNC: 170 MG/DL
CO2 SERPL-SCNC: 26 MMOL/L
CREAT SERPL-MCNC: 1.02 MG/DL
EGFR: 82 ML/MIN/1.73M2
EOSINOPHIL # BLD AUTO: 0.04 K/UL
EOSINOPHIL NFR BLD AUTO: 1 %
GLUCOSE SERPL-MCNC: 107 MG/DL
HCT VFR BLD CALC: 41.7 %
HDLC SERPL-MCNC: 54 MG/DL
HGB BLD-MCNC: 14.1 G/DL
IMM GRANULOCYTES NFR BLD AUTO: 0 %
LDLC SERPL CALC-MCNC: 101 MG/DL
LYMPHOCYTES # BLD AUTO: 1.16 K/UL
LYMPHOCYTES NFR BLD AUTO: 29.5 %
MAN DIFF?: NORMAL
MCHC RBC-ENTMCNC: 32.7 PG
MCHC RBC-ENTMCNC: 33.8 GM/DL
MCV RBC AUTO: 96.8 FL
MONOCYTES # BLD AUTO: 0.41 K/UL
MONOCYTES NFR BLD AUTO: 10.4 %
NEUTROPHILS # BLD AUTO: 2.29 K/UL
NEUTROPHILS NFR BLD AUTO: 58.3 %
NONHDLC SERPL-MCNC: 116 MG/DL
PLATELET # BLD AUTO: 249 K/UL
POTASSIUM SERPL-SCNC: 3.9 MMOL/L
PROT SERPL-MCNC: 7.2 G/DL
RBC # BLD: 4.31 M/UL
RBC # FLD: 12.6 %
SODIUM SERPL-SCNC: 136 MMOL/L
TRIGL SERPL-MCNC: 81 MG/DL
WBC # FLD AUTO: 3.93 K/UL

## 2023-11-06 ENCOUNTER — TRANSCRIPTION ENCOUNTER (OUTPATIENT)
Age: 63
End: 2023-11-06

## 2023-11-06 LAB
ESTIMATED AVERAGE GLUCOSE: 114 MG/DL
HBA1C MFR BLD HPLC: 5.6 %

## 2024-01-10 ENCOUNTER — TRANSCRIPTION ENCOUNTER (OUTPATIENT)
Age: 64
End: 2024-01-10

## 2024-01-10 RX ORDER — LOSARTAN POTASSIUM AND HYDROCHLOROTHIAZIDE 12.5; 1 MG/1; MG/1
100-12.5 TABLET ORAL
Qty: 90 | Refills: 3 | Status: ACTIVE | COMMUNITY
Start: 2021-02-22 | End: 1900-01-01

## 2024-01-10 RX ORDER — ATORVASTATIN CALCIUM 10 MG/1
10 TABLET, FILM COATED ORAL
Qty: 90 | Refills: 2 | Status: ACTIVE | COMMUNITY
Start: 2020-11-05 | End: 1900-01-01

## 2024-05-07 ENCOUNTER — OUTPATIENT (OUTPATIENT)
Dept: OUTPATIENT SERVICES | Facility: HOSPITAL | Age: 64
LOS: 1 days | End: 2024-05-07
Payer: MEDICARE

## 2024-05-07 ENCOUNTER — APPOINTMENT (OUTPATIENT)
Dept: RADIOLOGY | Facility: CLINIC | Age: 64
End: 2024-05-07
Payer: MEDICARE

## 2024-05-07 DIAGNOSIS — Z00.8 ENCOUNTER FOR OTHER GENERAL EXAMINATION: ICD-10-CM

## 2024-05-07 DIAGNOSIS — M25.559 PAIN IN UNSPECIFIED HIP: ICD-10-CM

## 2024-05-07 PROCEDURE — 72170 X-RAY EXAM OF PELVIS: CPT

## 2024-05-07 PROCEDURE — 72170 X-RAY EXAM OF PELVIS: CPT | Mod: 26,XE

## 2024-05-07 PROCEDURE — 73502 X-RAY EXAM HIP UNI 2-3 VIEWS: CPT | Mod: 26,RT

## 2024-05-07 PROCEDURE — 73502 X-RAY EXAM HIP UNI 2-3 VIEWS: CPT

## 2024-06-04 ENCOUNTER — TRANSCRIPTION ENCOUNTER (OUTPATIENT)
Age: 64
End: 2024-06-04

## 2024-06-05 ENCOUNTER — TRANSCRIPTION ENCOUNTER (OUTPATIENT)
Age: 64
End: 2024-06-05
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
## 2024-06-11 ENCOUNTER — LABORATORY RESULT (OUTPATIENT)
Age: 64
End: 2024-06-11

## 2024-06-12 ENCOUNTER — TRANSCRIPTION ENCOUNTER (OUTPATIENT)
Age: 64
End: 2024-06-12

## 2024-06-12 LAB
PSA FREE FLD-MCNC: 15 %
PSA FREE SERPL-MCNC: 1.37 NG/ML
PSA SERPL-MCNC: 9.44 NG/ML

## 2024-06-24 ENCOUNTER — APPOINTMENT (OUTPATIENT)
Dept: ORTHOPEDIC SURGERY | Facility: CLINIC | Age: 64
End: 2024-06-24

## 2024-06-24 VITALS
BODY MASS INDEX: 29.1 KG/M2 | HEIGHT: 68 IN | DIASTOLIC BLOOD PRESSURE: 81 MMHG | WEIGHT: 192 LBS | HEART RATE: 86 BPM | TEMPERATURE: 99.1 F | SYSTOLIC BLOOD PRESSURE: 150 MMHG

## 2024-06-24 DIAGNOSIS — M70.61 TROCHANTERIC BURSITIS, RIGHT HIP: ICD-10-CM

## 2024-06-24 PROCEDURE — 73030 X-RAY EXAM OF SHOULDER: CPT | Mod: RT

## 2024-06-24 PROCEDURE — 99203 OFFICE O/P NEW LOW 30 MIN: CPT

## 2024-06-25 PROBLEM — M70.61 TROCHANTERIC BURSITIS OF RIGHT HIP: Status: ACTIVE | Noted: 2024-06-24

## 2024-07-01 ENCOUNTER — APPOINTMENT (OUTPATIENT)
Dept: ORTHOPEDIC SURGERY | Facility: CLINIC | Age: 64
End: 2024-07-01
Payer: MEDICARE

## 2024-07-01 VITALS
WEIGHT: 192 LBS | HEART RATE: 69 BPM | SYSTOLIC BLOOD PRESSURE: 149 MMHG | DIASTOLIC BLOOD PRESSURE: 93 MMHG | HEIGHT: 68 IN | BODY MASS INDEX: 29.1 KG/M2

## 2024-07-01 DIAGNOSIS — S43.431A SUPERIOR GLENOID LABRUM LESION OF RIGHT SHOULDER, INITIAL ENCOUNTER: ICD-10-CM

## 2024-07-01 DIAGNOSIS — M75.41 IMPINGEMENT SYNDROME OF RIGHT SHOULDER: ICD-10-CM

## 2024-07-01 PROCEDURE — 99212 OFFICE O/P EST SF 10 MIN: CPT

## 2024-07-02 PROBLEM — M75.41 IMPINGEMENT SYNDROME OF RIGHT SHOULDER: Status: ACTIVE | Noted: 2024-06-24

## 2024-07-02 PROBLEM — S43.431A SUPERIOR GLENOID LABRUM LESION OF RIGHT SHOULDER, INITIAL ENCOUNTER: Status: ACTIVE | Noted: 2024-06-24

## 2024-09-23 NOTE — ED PROVIDER NOTE - CONSTITUTIONAL, MLM
To scheduling team   normal... Well appearing, awake, alert, oriented to person, place, time/situation and in no apparent distress.

## 2024-09-26 ENCOUNTER — TRANSCRIPTION ENCOUNTER (OUTPATIENT)
Age: 64
End: 2024-09-26

## 2024-11-11 ENCOUNTER — APPOINTMENT (OUTPATIENT)
Dept: ORTHOPEDIC SURGERY | Facility: CLINIC | Age: 64
End: 2024-11-11

## 2024-11-11 DIAGNOSIS — M70.61 TROCHANTERIC BURSITIS, RIGHT HIP: ICD-10-CM

## 2024-11-11 PROCEDURE — 20610 DRAIN/INJ JOINT/BURSA W/O US: CPT | Mod: RT

## 2024-11-14 VITALS — HEIGHT: 68 IN

## 2024-11-19 ENCOUNTER — APPOINTMENT (OUTPATIENT)
Dept: FAMILY MEDICINE | Facility: CLINIC | Age: 64
End: 2024-11-19
Payer: MEDICARE

## 2024-11-19 VITALS
TEMPERATURE: 97.4 F | OXYGEN SATURATION: 96 % | HEIGHT: 68 IN | WEIGHT: 189 LBS | HEART RATE: 64 BPM | BODY MASS INDEX: 28.64 KG/M2 | DIASTOLIC BLOOD PRESSURE: 78 MMHG | SYSTOLIC BLOOD PRESSURE: 130 MMHG

## 2024-11-19 DIAGNOSIS — R73.01 IMPAIRED FASTING GLUCOSE: ICD-10-CM

## 2024-11-19 DIAGNOSIS — R97.20 ELEVATED PROSTATE, SPECIFIC ANTIGEN [PSA]: ICD-10-CM

## 2024-11-19 DIAGNOSIS — E78.5 HYPERLIPIDEMIA, UNSPECIFIED: ICD-10-CM

## 2024-11-19 DIAGNOSIS — I10 ESSENTIAL (PRIMARY) HYPERTENSION: ICD-10-CM

## 2024-11-19 DIAGNOSIS — Z00.00 ENCOUNTER FOR GENERAL ADULT MEDICAL EXAMINATION W/OUT ABNORMAL FINDINGS: ICD-10-CM

## 2024-11-19 PROCEDURE — 36415 COLL VENOUS BLD VENIPUNCTURE: CPT

## 2024-11-19 PROCEDURE — G0402 INITIAL PREVENTIVE EXAM: CPT

## 2024-11-19 RX ORDER — FINASTERIDE 5 MG/1
5 TABLET, FILM COATED ORAL
Refills: 0 | Status: ACTIVE | COMMUNITY

## 2024-11-19 RX ORDER — NEBIVOLOL 5 MG/1
5 TABLET ORAL
Refills: 0 | Status: ACTIVE | COMMUNITY

## 2024-11-19 RX ORDER — LOSARTAN POTASSIUM AND HYDROCHLOROTHIAZIDE 25; 100 MG/1; MG/1
100-25 TABLET ORAL
Refills: 0 | Status: ACTIVE | COMMUNITY

## 2024-11-20 ENCOUNTER — TRANSCRIPTION ENCOUNTER (OUTPATIENT)
Age: 64
End: 2024-11-20

## 2024-12-02 ENCOUNTER — APPOINTMENT (OUTPATIENT)
Dept: ORTHOPEDIC SURGERY | Facility: CLINIC | Age: 64
End: 2024-12-02

## 2025-01-27 ENCOUNTER — RX RENEWAL (OUTPATIENT)
Age: 65
End: 2025-01-27

## (undated) DEVICE — STERIS DEFENDO 3-PIECE KIT (AIR/WATER, SUCTION & BIOPSY VALVES)

## (undated) DEVICE — FORCEP RADIAL JAW 4 240CM DISP